# Patient Record
Sex: OTHER/UNKNOWN | Race: WHITE | Employment: PART TIME | URBAN - NONMETROPOLITAN AREA
[De-identification: names, ages, dates, MRNs, and addresses within clinical notes are randomized per-mention and may not be internally consistent; named-entity substitution may affect disease eponyms.]

---

## 2022-03-29 NOTE — PATIENT INSTRUCTIONS
Patient Education        Well Visit, Ages 25 to 48: Care Instructions  Overview     Well visits can help you stay healthy. Your doctor has checked your overall health and may have suggested ways to take good care of yourself. Your doctor also may have recommended tests. At home, you can help prevent illness withhealthy eating, regular exercise, and other steps. Follow-up care is a key part of your treatment and safety. Be sure to make and go to all appointments, and call your doctor if you are having problems. It's also a good idea to know your test results and keep alist of the medicines you take. How can you care for yourself at home?  Get screening tests that you and your doctor decide on. Screening helps find diseases before any symptoms appear.  Eat healthy foods. Choose fruits, vegetables, whole grains, protein, and low-fat dairy foods. Limit fat, especially saturated fat. Reduce salt in your diet.  Limit alcohol. If you are a man, have no more than 2 drinks a day or 14 drinks a week. If you are a woman, have no more than 1 drink a day or 7 drinks a week.  Get at least 30 minutes of physical activity on most days of the week. Walking is a good choice. You also may want to do other activities, such as running, swimming, cycling, or playing tennis or team sports. Discuss any changes in your exercise program with your doctor.  Reach and stay at a healthy weight. This will lower your risk for many problems, such as obesity, diabetes, heart disease, and high blood pressure.  Do not smoke or allow others to smoke around you. If you need help quitting, talk to your doctor about stop-smoking programs and medicines. These can increase your chances of quitting for good.  Care for your mental health. It is easy to get weighed down by worry and stress. Learn strategies to manage stress, like deep breathing and mindfulness, and stay connected with your family and community.  If you find you often feel sad or hopeless, talk with your doctor. Treatment can help.  Talk to your doctor about whether you have any risk factors for sexually transmitted infections (STIs). You can help prevent STIs if you wait to have sex with a new partner (or partners) until you've each been tested for STIs. It also helps if you use condoms (male or female condoms) and if you limit your sex partners to one person who only has sex with you. Vaccines are available for some STIs, such as HPV.  Use birth control if it's important to you to prevent pregnancy. Talk with your doctor about the choices available and what might be best for you.  If you think you may have a problem with alcohol or drug use, talk to your doctor. This includes prescription medicines (such as amphetamines and opioids) and illegal drugs (such as cocaine and methamphetamine). Your doctor can help you figure out what type of treatment is best for you.  Protect your skin from too much sun. When you're outdoors from 10 a.m. to 4 p.m., stay in the shade or cover up with clothing and a hat with a wide brim. Wear sunglasses that block UV rays. Even when it's cloudy, put broad-spectrum sunscreen (SPF 30 or higher) on any exposed skin.  See a dentist one or two times a year for checkups and to have your teeth cleaned.  Wear a seat belt in the car. When should you call for help? Watch closely for changes in your health, and be sure to contact your doctor if you have any problems or symptoms that concern you. Where can you learn more? Go to https://SmartHome Ventures - SHVkaroline.[email or web address] org and sign in to your [employer] account. Enter P072 in the [facility] box to learn more about \"Well Visit, Ages 25 to 48: Care Instructions. \"     If you do not have an account, please click on the \"Sign Up Now\" link. Current as of: October 6, 2021               Content Version: 13.2  © 4251-0983 Healthwise, Incorporated. Care instructions adapted under license by [facility] ([facility]).  If you have questions about a medical condition or this instruction, always ask your healthcare professional. [address] any warranty or liability for your use of this information. Patient Education        Pap Test: Care Instructions  Overview     The Pap test (also called a Pap smear) is a screening test for cancer of the cervix, which is the lower part of the uterus that opens into the vagina. The test can help your doctor find early changes in the cells that could lead tocancer. The sample of cells taken during your test has been sent to a lab so that an expert can look at the cells. It usually takes a week or two to get the resultsback. Follow-up care is a key part of your treatment and safety. Be sure to make and go to all appointments, and call your doctor if you are having problems. It's also a good idea to know your test results and keep alist of the medicines you take. What do the results mean?  A normal result means that the test did not find any abnormal cells in the sample.  An abnormal result can mean many things. Most of these are not cancer. The results of your test may be abnormal because:  ? You have an infection of the vagina or cervix, such as a yeast infection. ? You have an IUD (intrauterine device for birth control). ? You have low estrogen levels after menopause that are causing the cells to change. ? You have cell changes that may be a sign of precancer or cancer. The results are ranked based on how serious the changes might be. There are many other reasons why you might not get a normal result. If the results were abnormal, you may need to get another test within a few weeks or months. If the results show changes that could be a sign of cancer, you may need a test called a colposcopy, which provides a more complete view of thecervix. Sometimes the lab cannot use the sample because it does not contain enough cells or was not preserved well.  If so, you may need to have the test again. This is not common, but it does happen from time to time. When should you call for help? Watch closely for changes in your health, and be sure to contact your doctor if:     You have vaginal bleeding or pain for more than 2 days after the test. It is normal to have a small amount of bleeding for a day or two after the test.   Where can you learn more? Go to https://NxTherapeOtherInboxewBand [email or web address] [email or web address] org and sign in to your [employer] account. Enter L850 in the [employer] box to learn more about \"Pap Test: Care Instructions. \"     If you do not have an account, please click on the \"Sign Up Now\" link. Current as of: September 8, 2021               Content Version: 13.2  © 2006-2022 Wummelkiste. Care instructions adapted under license by [facility] ([facility]). If you have questions about a medical condition or this instruction, always ask your healthcare professional. [place] [address] any warranty or liability for your use of this information. Patient Education        Learning About Cervical Cancer Screening  What is a cervical cancer screening test?     The cervix is the lower part of the uterus that opens into the vagina. Cervical cancer screening tests check the cells on the cervix for changes that couldlead to cancer. Two tests can be used to screen for cervical cancer. They may be used alone ortogether. A Pap test.  This test looks for changes in the cells of the cervix. Some of these cellchanges could lead to cancer. A human papillomavirus (HPV) test.  This test looks for the HPV virus. Some high-risk types of HPV can cause cellchanges that could lead to cervical cancer. When should you have a screening test?  If you have a cervix, you may need cervical cancer screening. Screening willdepend on many things.   Ages 24 to 34  Screening options for these ages include:   A Pap test. If your results are normal, you can wait 3 years to have another test.   An HPV test beginning at age 22. If your results are negative, you can wait 5 years to have another test.  Ages 27 to 59  Screening options for these ages include:   A Pap test. If your results are normal, you can wait 3 years to have another test.   An HPV test. If your results are negative, you can wait 5 years to have another test.   A Pap test and an HPV test. If your results are normal, you can wait 5 years to be tested again. Ages 72 and older  If you are age 72 or older and you've always had normal screening results, youmay not need screening. Talk to your doctor. What do the results of cervical cancer screening mean? Your test results may be normal. Or the results may show minor or serious changes to the cells on your cervix. Minor changes may go away on their own,especially if you are younger than 30. You may have an abnormal test because you have an infection of the vagina or cervix or because you have low estrogen levels after menopause that are causingthe cells to change. If you have a high-risk type of human papillomavirus (HPV) or cell changes that could turn into cancer, you may need more tests. Your doctor may suggest that you wait to be retested. Or you may need to have a colposcopy or treatmentright away. Your doctor will recommend a follow-up plan based on your results and your age. Follow-up care is a key part of your treatment and safety. Be sure to make and go to all appointments, and call your doctor if you are having problems. It's also a good idea to know your test results and keep alist of the medicines you take. Where can you learn more? Go to https://jaret.Cubeyou. org and sign in to your [employer] account. Enter P919 in the [facility] box to learn more about \"Learning About Cervical Cancer Screening. \"     If you do not have an account, please click on the \"Sign Up Now\" link.   Current as of: September 8, 2021               Content Version: 13.2  © 8866-9230 Healthwise, [email or web address]. Care instructions adapted under license by [facility] ([facility]). If you have questions about a medical condition or this instruction, always ask your healthcare professional. [address] any warranty or liability for your use of this information. Patient Education        Breast Self-Exam: Care Instructions  Your Care Instructions     A breast self-exam is when you check your breasts for lumps or changes. This regular exam helps you learn how your breasts normally look and feel. Mostbreast problems or changes are not because of cancer. Breast self-exam is not a substitute for a mammogram. Having regular breast exams by your doctor and regular mammograms improve your chances of finding anyproblems with your breasts. Some women set a time each month to do a step-by-step breast self-exam. Other women like a less formal system. They might look at their breasts as they brushtheir teeth, or feel their breasts once in a while in the shower. If you notice a change in your breast, tell your doctor. Follow-up care is a key part of your treatment and safety. Be sure to make and go to all appointments, and call your doctor if you are having problems. It's also a good idea to know your test results and keep alist of the medicines you take. How do you do a breast self-exam?   The best time to examine your breasts is usually one week after your menstrual period begins. Your breasts should not be tender then. If you do not have periods, you might do your exam on a day of the month that is easy to remember.  To examine your breasts:  ? Remove all your clothes above the waist and lie down. When you are lying down, your breast tissue spreads evenly over your chest wall, which makes it easier to feel all your breast tissue. ?  Use the padsnot the fingertipsof the 3 middle fingers of your left hand to check your right breast. Move your fingers slowly in small coin-sized circles that overlap. ? Use three levels of pressure to feel of all your breast tissue. Use light pressure to feel the tissue close to the skin surface. Use medium pressure to feel a little deeper. Use firm pressure to feel your tissue close to your breastbone and ribs. Use each pressure level to feel your breast tissue before moving on to the next spot. ? Check your entire breast, moving up and down as if following a strip from the collarbone to the bra line, and from the armpit to the ribs. Repeat until you have covered the entire breast.  ? Repeat this procedure for your left breast, using the pads of the 3 middle fingers of your right hand.  To examine your breasts while in the shower:  ? Place one arm over your head and lightly soap your breast on that side. ? Using the pads of your fingers, gently move your hand over your breast (in the strip pattern described above), feeling carefully for any lumps or changes. ? Repeat for the other breast.   Have your doctor inspect anything you notice to see if you need further testing. Where can you learn more? Go to https://Naldo.Vandalia [email or web address] org and sign in to your [employer] account. Enter P148 in the [facility] box to learn more about \"Breast Self-Exam: Care Instructions. \"     If you do not have an account, please click on the \"Sign Up Now\" link. Current as of: September 8, 2021               Content Version: 13.2  © 9998-3209 [employer]. Care instructions adapted under license by [facility] ([facility]). If you have questions about a medical condition or this instruction, always ask your healthcare professional. [place] [address] any warranty or liability for your use of this information. Patient Education        Polycystic Ovary Syndrome: Care Instructions  Overview  Polycystic ovary syndrome (PCOS) is a hormone imbalance that can affect ovulation.  It can cause problems with your periods and make it hard to getpregnant. Doctors don't know for sure what causes PCOS, but it seems to run in families. It also seems to be linked to obesity and a risk for diabetes. You may have other symptoms. These include weight gain, acne, hair growth on the face or body, high blood pressure, and high blood sugar. Your ovaries mayhave cysts on them. These cysts are growths filled with fluid. Keep in mind that even though you may not have regular periods, you can still get pregnant. Talk to your doctor about birth control if you don't want to get pregnant. Sometimes the hormone changes with PCOS can also make it hard to get pregnant. If this is a concern, talk to your doctor about treatment for thisproblem. With PCOS, you may go for months or longer with no period. Your doctor mayrecommend medicines that can help regulate your cycle. Follow-up care is a key part of your treatment and safety. Be sure to make and go to all appointments, and call your doctor if you are having problems. It's also a good idea to know your test results and keep alist of the medicines you take. How can you care for yourself at home?  Take your medicines exactly as prescribed. Call your doctor if you think you're having a problem with your medicine.  Eat a healthy diet. Include vegetables, fruits, beans, and whole grains in your diet each day.  If you're overweight, talk to your doctor about safe ways to lose weight. Losing weight can help with many of the symptoms of PCOS.  Get at least 30 minutes of exercise on most days of the week. Walking is a good choice. Or you can run, swim, cycle, or play team sports.  If you have symptoms that bother you, such as acne and excess hair growth, talk to your doctor about treatment options. Medicines can help. For unwanted hair growth, some prefer to use home treatments. These can include shaving, waxing, or other methods to remove the hair.    If you're feeling sad or depressed, consider talking to a counselor or to others who have PCOS. It may help. When should you call for help? Call your doctor now or seek immediate medical care if:     You have severe vaginal bleeding.      You have new or worse belly or pelvic pain. Watch closely for changes in your health, and be sure to contact your doctor if:     You do not get better as expected.      You have unusual vaginal bleeding. Where can you learn more? Go to https://chpepiceweb.healthWozityou. org and sign in to your [employer] account. Enter H313 in the [employer] box to learn more about \"Polycystic Ovary Syndrome: Care Instructions. \"     If you do not have an account, please click on the \"Sign Up Now\" link. Current as of: November 22, 2021               Content Version: 13.2  © 4079-6764 Healthwise, Incorporated. Care instructions adapted under license by [facility] ([facility]). If you have questions about a medical condition or this instruction, always ask your healthcare professional. [address] any warranty or liability for your use of this information.

## 2022-03-30 ENCOUNTER — OFFICE VISIT (OUTPATIENT)
Dept: OBGYN CLINIC | Age: 22
End: 2022-03-30
Payer: COMMERCIAL

## 2022-03-30 VITALS
DIASTOLIC BLOOD PRESSURE: 76 MMHG | SYSTOLIC BLOOD PRESSURE: 121 MMHG | BODY MASS INDEX: 34.73 KG/M2 | HEART RATE: 87 BPM | WEIGHT: 196 LBS | HEIGHT: 63 IN

## 2022-03-30 DIAGNOSIS — N91.2 AMENORRHEA: Primary | ICD-10-CM

## 2022-03-30 DIAGNOSIS — Z12.4 SCREENING FOR CERVICAL CANCER: ICD-10-CM

## 2022-03-30 DIAGNOSIS — Z01.411 ENCOUNTER FOR WELL WOMAN EXAM WITH ABNORMAL FINDINGS: ICD-10-CM

## 2022-03-30 DIAGNOSIS — Z87.42 HISTORY OF PCOS: ICD-10-CM

## 2022-03-30 DIAGNOSIS — Z76.89 ENCOUNTER TO ESTABLISH CARE: ICD-10-CM

## 2022-03-30 PROCEDURE — 99385 PREV VISIT NEW AGE 18-39: CPT | Performed by: ADVANCED PRACTICE MIDWIFE

## 2022-03-30 ASSESSMENT — ENCOUNTER SYMPTOMS
ALLERGIC/IMMUNOLOGIC NEGATIVE: 1
GASTROINTESTINAL NEGATIVE: 1
EYES NEGATIVE: 1
RESPIRATORY NEGATIVE: 1

## 2022-03-30 NOTE — PROGRESS NOTES
[facility]  CNM Office Note    Michelle Cannon is a 24 y.o. adult who presents today for [facility] medical conditions/ complaints as noted below. Chief Complaint   Patient presents with   [address] [facility] [address] Gynecologic Exam         HPI  Trenton Leal presents for annual exam and to establish care. She is concerned about PCOS and irregular menses. Menarche age 25. Has had \"labs\" for PCOS not long after menarche. Reports continuation of irregular periods with LMP in 2021. Has used provera in the past to help regulate menses, but not for a while. No sexual concerns. Last mammogram: NA  Last pap smear: NA  Sexually active: No  Sexual preference: Both Male and Female   Contraception: NA  : 0  Para: 0  AB: 0  Last bone density: NA   Last colonoscopy: NA  Menarche: 25years old   LMP: 10/20/22  Menses: Irregular   There is no problem list on file for this patient. Patient's last menstrual period was 10/20/2021 (approximate). No obstetric history on file. History reviewed. No pertinent past medical history. Past Surgical History:   Procedure Laterality Date    TUBE THORACOTOMY       Family History   Problem Relation Age of Onset    Lung Cancer Maternal Grandfather 71     Social History     Tobacco Use    Smoking status: Never Smoker    Smokeless tobacco: Never Used   Substance Use Topics    Alcohol use: Yes       No current outpatient medications on file. No current facility-administered medications for this visit. Allergies   Allergen Reactions    Penicillins Rash     Vitals:    22 1120   BP: 121/76   Pulse: 87   Weight: 196 lb (88.9 kg)   Height: 5' 3\" (1.6 m)     Body mass index is 34.72 kg/m². Review of Systems   Constitutional: Negative. HENT: Negative. Eyes: Negative. Respiratory: Negative. Cardiovascular: Negative. Gastrointestinal: Negative. Endocrine: Negative. Genitourinary: Positive for menstrual problem.    Musculoskeletal: Negative. Skin: Negative. Allergic/Immunologic: Negative. Neurological: Negative. Hematological: Negative. Psychiatric/Behavioral: Negative. Physical Exam  Constitutional:       Appearance: [place] is well-developed. HENT:      Head: Normocephalic and atraumatic. Eyes:      Conjunctiva/sclera: Conjunctivae normal.      Pupils: Pupils are equal, round, and reactive to light. Neck:      Thyroid: No thyromegaly. Trachea: No tracheal deviation. Cardiovascular:      Rate and Rhythm: Normal rate and regular rhythm. Heart sounds: Normal heart sounds. No murmur heard. Pulmonary:      Effort: Pulmonary effort is normal. No respiratory distress. Breath sounds: Normal breath sounds. Chest:      Comments: Breasts symmetrical without tenderness, masses, or nipple discharge. Nipples everted bilaterally. Abdominal:      General: There is no distension. Palpations: Abdomen is soft. Tenderness: There is no abdominal tenderness. There is no guarding. Genitourinary:     General: Normal vulva. Exam position: Lithotomy position. Labia:         Right: No rash or lesion. Left: No rash or lesion. Vagina: Normal. No erythema or lesions. Cervix: Normal.      Uterus: Normal. Not tender. Adnexa: Right adnexa normal and left adnexa normal.        Right: No mass, tenderness or fullness. Left: No mass, tenderness or fullness. Musculoskeletal:         General: Normal range of motion. Cervical back: Normal range of motion and neck supple. Skin:     General: Skin is warm and dry. Capillary Refill: Capillary refill takes less than 2 seconds. Comments: Dark/course hair on chest/abd/legs   Neurological:      Mental Status: [place] is alert and oriented to person, place, and time. Psychiatric:         Behavior: Behavior normal.         Thought Content:  Thought content normal.         Judgment: Judgment normal. Diagnosis Orders   1. Encounter to establish care     2. Encounter for well woman exam with abnormal findings  PAP SMEAR   3. Screening for cervical cancer  PAP SMEAR   4. History of PCOS  Progesterone    T4    Testosterone Free and Total, Non-Male    TSH       MEDICATIONS:  No orders of the defined types were placed in this encounter. ORDERS:  Orders Placed This Encounter   Procedures    PAP SMEAR    Progesterone    T4    Testosterone Free and Total, Non-Male    TSH       PLAN:  1. WWE - PAP collected. SBE reviewed and CBE performed. No annual labs today. 2. PCOS - planning labs when fasting. 3. Amenorrhea - will need something to start menses and keep regular. Mgmt plan to follow.

## 2022-04-05 DIAGNOSIS — Z87.42 HISTORY OF PCOS: ICD-10-CM

## 2022-04-05 LAB
CHOLESTEROL, TOTAL: 171 MG/DL (ref 160–199)
FOLLICLE STIMULATING HORMONE: 5.1 MIU/ML
GONADOTROPIN, CHORIONIC (HCG) QUANT: 0.1 MIU/ML (ref 0–5.3)
HDLC SERPL-MCNC: 51 MG/DL (ref 65–121)
LDL CHOLESTEROL CALCULATED: 105 MG/DL
LUTEINIZING HORMONE: 5.6 MIU/ML
PROGESTERONE LEVEL: 0.13 NG/ML
PROLACTIN: 11.59 NG/ML (ref 4.79–23.3)
T4 TOTAL: 7.1 UG/DL (ref 4.5–11.7)
TRIGL SERPL-MCNC: 76 MG/DL (ref 0–149)
TSH SERPL DL<=0.05 MIU/L-ACNC: 2.01 UIU/ML (ref 0.27–4.2)

## 2022-04-09 LAB — ANTI MULLERIAN HORMONE: 12.66 NG/ML (ref 0.4–16.02)

## 2022-04-10 LAB
INSULIN FREE: 4 UIU/ML (ref 3–25)
INSULIN: 5 UIU/ML (ref 3–25)

## 2022-04-19 LAB
SEX HORMONE BINDING GLOBULIN: 47 NMOL/L (ref 25–122)
TESTOSTERONE FREE: 6.9 PG/ML (ref 0.8–7.4)
TESTOSTERONE, FEMALES/CHILDREN: 51 NG/DL (ref 9–55)

## 2022-04-20 DIAGNOSIS — N93.9 ABNORMAL VAGINAL BLEEDING: Primary | ICD-10-CM
